# Patient Record
Sex: FEMALE | Race: BLACK OR AFRICAN AMERICAN | NOT HISPANIC OR LATINO | URBAN - METROPOLITAN AREA
[De-identification: names, ages, dates, MRNs, and addresses within clinical notes are randomized per-mention and may not be internally consistent; named-entity substitution may affect disease eponyms.]

---

## 2017-03-06 ENCOUNTER — OUTPATIENT (OUTPATIENT)
Dept: OUTPATIENT SERVICES | Facility: HOSPITAL | Age: 27
LOS: 1 days | End: 2017-03-06

## 2017-03-06 VITALS
TEMPERATURE: 99 F | HEART RATE: 88 BPM | WEIGHT: 156.97 LBS | SYSTOLIC BLOOD PRESSURE: 122 MMHG | RESPIRATION RATE: 16 BRPM | DIASTOLIC BLOOD PRESSURE: 72 MMHG | HEIGHT: 64 IN

## 2017-03-06 DIAGNOSIS — M26.601 RIGHT TEMPOROMANDIBULAR JOINT DISORDER, UNSPECIFIED: ICD-10-CM

## 2017-03-06 DIAGNOSIS — M26.629 ARTHRALGIA OF TEMPOROMANDIBULAR JOINT, UNSPECIFIED SIDE: ICD-10-CM

## 2017-03-06 LAB
HCG SERPL-ACNC: < 5 MIU/ML — SIGNIFICANT CHANGE UP
HCT VFR BLD CALC: 39.3 % — SIGNIFICANT CHANGE UP (ref 34.5–45)
HGB BLD-MCNC: 13.2 G/DL — SIGNIFICANT CHANGE UP (ref 11.5–15.5)
MCHC RBC-ENTMCNC: 32.8 PG — SIGNIFICANT CHANGE UP (ref 27–34)
MCHC RBC-ENTMCNC: 33.6 % — SIGNIFICANT CHANGE UP (ref 32–36)
MCV RBC AUTO: 97.5 FL — SIGNIFICANT CHANGE UP (ref 80–100)
PLATELET # BLD AUTO: 212 K/UL — SIGNIFICANT CHANGE UP (ref 150–400)
PMV BLD: 10.5 FL — SIGNIFICANT CHANGE UP (ref 7–13)
RBC # BLD: 4.03 M/UL — SIGNIFICANT CHANGE UP (ref 3.8–5.2)
RBC # FLD: 12.5 % — SIGNIFICANT CHANGE UP (ref 10.3–14.5)
WBC # BLD: 8.34 K/UL — SIGNIFICANT CHANGE UP (ref 3.8–10.5)
WBC # FLD AUTO: 8.34 K/UL — SIGNIFICANT CHANGE UP (ref 3.8–10.5)

## 2017-03-06 NOTE — H&P PST ADULT - NEGATIVE OPHTHALMOLOGIC SYMPTOMS
no photophobia/no blurred vision L/no blurred vision R/no lacrimation L/no discharge L/no lacrimation R/no diplopia

## 2017-03-06 NOTE — H&P PST ADULT - VISION (WITH CORRECTIVE LENSES IF THE PATIENT USUALLY WEARS THEM):
reading and distance glasses/Partially impaired: cannot see medication labels or newsprint, but can see obstacles in path, and the surrounding layout; can count fingers at arm's length

## 2017-03-06 NOTE — H&P PST ADULT - NSANTHOSAYNRD_GEN_A_CORE
No. SHOSHANA screening performed.  STOP BANG Legend: 0-2 = LOW Risk; 3-4 = INTERMEDIATE Risk; 5-8 = HIGH Risk

## 2017-03-06 NOTE — H&P PST ADULT - NEGATIVE ENMT SYMPTOMS
no sinus symptoms/no tinnitus/no ear pain/no nasal congestion/no hearing difficulty/no vertigo no hearing difficulty/no vertigo/no nasal congestion/no sinus symptoms

## 2017-03-06 NOTE — H&P PST ADULT - REASON FOR ADMISSION
adhesions ankylosis of articular adhesions ankylosis, right temporomandibular joint articular disc disorder.

## 2017-03-06 NOTE — H&P PST ADULT - HISTORY OF PRESENT ILLNESS
26yr old female with h/o pain and swelling around the right ear and vertigo in right ear x two yrs, presents to PST for pre surgical evaluation 26yr old female with h/o pain and swelling around the right ear and vertigo in right ear x two yrs, presents to [facility] for pre surgical evaluation for right temporomandibular joint arthroscopy on 3/9/17. 26yr old female with h/o intermittent pain and swelling around the right ear and vertigo only  in right ear x two yrs, presents to [facility] for pre surgical evaluation for right temporomandibular joint arthroscopy on 3/9/17.

## 2017-03-06 NOTE — H&P PST ADULT - PROBLEM SELECTOR PLAN 1
Scheduled to have right temporomandibular joint arthroscopy on 3/9/17. Pre surgical instructions provided.

## 2017-03-15 DIAGNOSIS — M26.639 ARTICULAR DISC DISORDER OF TEMPOROMANDIBULAR JOINT, UNSPECIFIED SIDE: ICD-10-CM

## 2017-04-24 ENCOUNTER — OUTPATIENT (OUTPATIENT)
Dept: OUTPATIENT SERVICES | Facility: HOSPITAL | Age: 27
LOS: 1 days | End: 2017-04-24

## 2017-04-24 VITALS
HEIGHT: 63.5 IN | TEMPERATURE: 98 F | SYSTOLIC BLOOD PRESSURE: 120 MMHG | DIASTOLIC BLOOD PRESSURE: 70 MMHG | HEART RATE: 77 BPM | RESPIRATION RATE: 14 BRPM | WEIGHT: 169.98 LBS | OXYGEN SATURATION: 100 %

## 2017-04-24 DIAGNOSIS — M26.631 ARTICULAR DISC DISORDER OF RIGHT TEMPOROMANDIBULAR JOINT: ICD-10-CM

## 2017-04-24 DIAGNOSIS — M26.629 ARTHRALGIA OF TEMPOROMANDIBULAR JOINT, UNSPECIFIED SIDE: ICD-10-CM

## 2017-04-24 DIAGNOSIS — M26.639 ARTICULAR DISC DISORDER OF TEMPOROMANDIBULAR JOINT, UNSPECIFIED SIDE: ICD-10-CM

## 2017-04-24 LAB
BUN SERPL-MCNC: 14 MG/DL — SIGNIFICANT CHANGE UP (ref 7–23)
CALCIUM SERPL-MCNC: 9 MG/DL — SIGNIFICANT CHANGE UP (ref 8.4–10.5)
CHLORIDE SERPL-SCNC: 106 MMOL/L — SIGNIFICANT CHANGE UP (ref 98–107)
CO2 SERPL-SCNC: 24 MMOL/L — SIGNIFICANT CHANGE UP (ref 22–31)
CREAT SERPL-MCNC: 0.95 MG/DL — SIGNIFICANT CHANGE UP (ref 0.5–1.3)
GLUCOSE SERPL-MCNC: 75 MG/DL — SIGNIFICANT CHANGE UP (ref 70–99)
HCT VFR BLD CALC: 38.6 % — SIGNIFICANT CHANGE UP (ref 34.5–45)
HGB BLD-MCNC: 12.7 G/DL — SIGNIFICANT CHANGE UP (ref 11.5–15.5)
MCHC RBC-ENTMCNC: 32.2 PG — SIGNIFICANT CHANGE UP (ref 27–34)
MCHC RBC-ENTMCNC: 32.9 % — SIGNIFICANT CHANGE UP (ref 32–36)
MCV RBC AUTO: 98 FL — SIGNIFICANT CHANGE UP (ref 80–100)
PLATELET # BLD AUTO: 241 K/UL — SIGNIFICANT CHANGE UP (ref 150–400)
PMV BLD: 10.2 FL — SIGNIFICANT CHANGE UP (ref 7–13)
POTASSIUM SERPL-MCNC: 4 MMOL/L — SIGNIFICANT CHANGE UP (ref 3.5–5.3)
POTASSIUM SERPL-SCNC: 4 MMOL/L — SIGNIFICANT CHANGE UP (ref 3.5–5.3)
RBC # BLD: 3.94 M/UL — SIGNIFICANT CHANGE UP (ref 3.8–5.2)
RBC # FLD: 12.9 % — SIGNIFICANT CHANGE UP (ref 10.3–14.5)
SODIUM SERPL-SCNC: 143 MMOL/L — SIGNIFICANT CHANGE UP (ref 135–145)
WBC # BLD: 7.49 K/UL — SIGNIFICANT CHANGE UP (ref 3.8–10.5)
WBC # FLD AUTO: 7.49 K/UL — SIGNIFICANT CHANGE UP (ref 3.8–10.5)

## 2017-04-24 RX ORDER — SODIUM CHLORIDE 9 MG/ML
1000 INJECTION, SOLUTION INTRAVENOUS
Qty: 0 | Refills: 0 | Status: DISCONTINUED | OUTPATIENT
Start: 2017-04-26 | End: 2017-05-11

## 2017-04-24 NOTE — H&P PST ADULT - NEGATIVE ENMT SYMPTOMS
no post-nasal discharge/no dry mouth/no vertigo/no dysphagia/no sinus symptoms/no recurrent cold sores/no ear pain/no nose bleeds/no abnormal taste sensation/no nasal discharge/no nasal congestion/no nasal obstruction/no throat pain/no tinnitus/no hearing difficulty

## 2017-04-24 NOTE — H&P PST ADULT - PROBLEM SELECTOR PLAN 1
Pt scheduled for right temporomandibular joint arthroscopy on 4/26/2017.  labs done results pending, Urine cup provided.  Preop teaching done, pt able to verbalize understanding.

## 2017-04-24 NOTE — H&P PST ADULT - PMH
PCOS (polycystic ovarian syndrome)    TMJ arthralgia  x 2yrs PCOS (polycystic ovarian syndrome)    TMJ arthralgia

## 2017-04-24 NOTE — H&P PST ADULT - NEGATIVE FEMALE-SPECIFIC SYMPTOMS
no irregular menses/no spotting/no abnormal vaginal bleeding/no pelvic pain no spotting/no pelvic pain/no abnormal vaginal bleeding

## 2017-04-24 NOTE — H&P PST ADULT - HISTORY OF PRESENT ILLNESS
25y/o female scheduled for right TMJ arthroscopy on 4/26/2017.  Pt states, "has difficulty opening and closing mouth due to right tmj pain, and clicking.  MRI was done. Surgery was previously scheduled for 3/2017 canceled due to illness."

## 2017-04-24 NOTE — H&P PST ADULT - NEUROLOGICAL
negative detailed exam Alert & oriented; no sensory, motor or coordination deficits, normal reflexes

## 2017-04-24 NOTE — H&P PST ADULT - NEGATIVE BREAST SYMPTOMS
no breast tenderness L/no breast lump L/no breast tenderness R/no nipple discharge L/no breast lump R/no nipple discharge R

## 2017-04-24 NOTE — H&P PST ADULT - VISION (WITH CORRECTIVE LENSES IF THE PATIENT USUALLY WEARS THEM):
Normal vision: sees adequately in most situations; can see medication labels, newsprint/reading and distance glasses

## 2017-04-24 NOTE — H&P PST ADULT - GASTROINTESTINAL DETAILS
soft/bowel sounds normal no distention/soft/no guarding/no organomegaly/no masses palpable/no rigidity/no bruit/nontender/no rebound tenderness/bowel sounds normal

## 2017-04-24 NOTE — H&P PST ADULT - NEGATIVE GENERAL SYMPTOMS
no weight loss/no sweating/no fever/no anorexia/no malaise/no polydipsia/no polyphagia/no polyuria/no chills/no fatigue/no weight gain

## 2017-04-26 ENCOUNTER — TRANSCRIPTION ENCOUNTER (OUTPATIENT)
Age: 27
End: 2017-04-26

## 2017-04-26 ENCOUNTER — OUTPATIENT (OUTPATIENT)
Dept: OUTPATIENT SERVICES | Facility: HOSPITAL | Age: 27
LOS: 1 days | Discharge: HOME | End: 2017-04-26

## 2017-04-26 VITALS
SYSTOLIC BLOOD PRESSURE: 116 MMHG | OXYGEN SATURATION: 99 % | RESPIRATION RATE: 14 BRPM | DIASTOLIC BLOOD PRESSURE: 70 MMHG | HEART RATE: 69 BPM

## 2017-04-26 VITALS
SYSTOLIC BLOOD PRESSURE: 120 MMHG | HEART RATE: 77 BPM | TEMPERATURE: 98 F | DIASTOLIC BLOOD PRESSURE: 70 MMHG | RESPIRATION RATE: 14 BRPM | OXYGEN SATURATION: 100 %

## 2017-04-26 DIAGNOSIS — M26.639 ARTICULAR DISC DISORDER OF TEMPOROMANDIBULAR JOINT, UNSPECIFIED SIDE: ICD-10-CM

## 2017-04-26 LAB — HCG UR QL: NEGATIVE — SIGNIFICANT CHANGE UP

## 2017-04-26 RX ORDER — IBUPROFEN 200 MG
1 TABLET ORAL
Qty: 20 | Refills: 0 | OUTPATIENT
Start: 2017-04-26 | End: 2017-05-01

## 2017-04-26 RX ORDER — CEPHALEXIN 500 MG
1 CAPSULE ORAL
Qty: 28 | Refills: 0 | OUTPATIENT
Start: 2017-04-26 | End: 2017-05-03

## 2017-04-26 RX ORDER — FENTANYL CITRATE 50 UG/ML
25 INJECTION INTRAVENOUS
Qty: 0 | Refills: 0 | Status: DISCONTINUED | OUTPATIENT
Start: 2017-04-26 | End: 2017-04-26

## 2017-04-26 RX ORDER — FENTANYL CITRATE 50 UG/ML
50 INJECTION INTRAVENOUS
Qty: 0 | Refills: 0 | Status: DISCONTINUED | OUTPATIENT
Start: 2017-04-26 | End: 2017-04-26

## 2017-04-26 RX ORDER — ONDANSETRON 8 MG/1
4 TABLET, FILM COATED ORAL ONCE
Qty: 0 | Refills: 0 | Status: DISCONTINUED | OUTPATIENT
Start: 2017-04-26 | End: 2017-05-11

## 2017-04-26 RX ADMIN — FENTANYL CITRATE 25 MICROGRAM(S): 50 INJECTION INTRAVENOUS at 09:45

## 2017-04-26 RX ADMIN — SODIUM CHLORIDE 30 MILLILITER(S): 9 INJECTION, SOLUTION INTRAVENOUS at 09:16

## 2017-04-26 RX ADMIN — FENTANYL CITRATE 25 MICROGRAM(S): 50 INJECTION INTRAVENOUS at 10:15

## 2017-04-26 NOTE — H&P ADULT. - GASTROINTESTINAL DETAILS
no masses palpable/no organomegaly/no distention/no bruit/no rebound tenderness/nontender/soft/no guarding/bowel sounds normal/no rigidity

## 2017-04-26 NOTE — H&P ADULT. - NEGATIVE CARDIOVASCULAR SYMPTOMS
no orthopnea/no paroxysmal nocturnal dyspnea/no palpitations/no chest pain/no peripheral edema/no dyspnea on exertion

## 2017-04-26 NOTE — H&P ADULT. - NEGATIVE BREAST SYMPTOMS
no breast tenderness L/no breast tenderness R/no breast lump R/no nipple discharge L/no breast lump L/no nipple discharge R

## 2017-04-26 NOTE — ASU DISCHARGE PLAN (ADULT/PEDIATRIC). - NOTIFY
Numbness, color, or temperature change to extremity Swelling that continues/Numbness, tingling/Pain not relieved by Medications/Numbness, color, or temperature change to extremity/Unable to Urinate/Bleeding that does not stop/Fever greater than 101/Inability to Tolerate Liquids or Foods/Persistent Nausea and Vomiting

## 2017-04-26 NOTE — H&P ADULT. - NEGATIVE GENERAL SYMPTOMS
no chills/no polyuria/no malaise/no fever/no polyphagia/no anorexia/no polydipsia/no sweating/no weight gain/no fatigue/no weight loss

## 2017-04-26 NOTE — H&P ADULT. - NEGATIVE ENMT SYMPTOMS
no hearing difficulty/no nasal obstruction/no nasal discharge/no abnormal taste sensation/no recurrent cold sores/no tinnitus/no vertigo/no dry mouth/no sinus symptoms/no nasal congestion/no ear pain/no nose bleeds/no post-nasal discharge/no throat pain/no dysphagia

## 2017-04-26 NOTE — ASU DISCHARGE PLAN (ADULT/PEDIATRIC). - MEDICATION SUMMARY - MEDICATIONS TO TAKE
I will START or STAY ON the medications listed below when I get home from the hospital:    Vicodin 300 mg-5 mg oral tablet  -- 1 tab(s) by mouth every 6 hours, As Needed -for severe pain MDD:8 tabs  -- Caution federal law prohibits the transfer of this drug to any person other  than the person for whom it was prescribed.  Do not drink alcoholic beverages when taking this medication.  May cause drowsiness.  Alcohol may intensify this effect.  Use care when operating dangerous machinery.  This drug may impair the ability to drive or operate machinery.  Use care until you become familiar with its effects.  This product contains acetaminophen.  Do not use  with any other product containing acetaminophen to prevent possible liver damage.  Using more of this medication than prescribed may cause serious breathing problems.    -- Indication: For pain    ibuprofen 600 mg oral tablet  -- 1 tab(s) by mouth every 6 hours  -- Do not take this drug if you are pregnant.  It is very important that you take or use this exactly as directed.  Do not skip doses or discontinue unless directed by your doctor.  May cause drowsiness or dizziness.  Obtain medical advice before taking any non-prescription drugs as some may affect the action of this medication.  Take with food or milk.    -- Indication: For pain    Keflex 500 mg oral capsule  -- 1 cap(s) by mouth 4 times a day  -- Finish all this medication unless otherwise directed by prescriber.    -- Indication: For Antibiotic

## 2017-04-28 NOTE — PACU DISCHARGE NOTE - PAIN:
See 4/7/17 encounter. Patients son Willi Blue requested that this refill be sent to the [facility] in [place] and that was done on 4/7/17.  I left message for patients son to call back and let us know if they need at [facility] in [facility] now, I tried patient but she w Controlled with current regime

## 2017-05-07 ENCOUNTER — EMERGENCY (EMERGENCY)
Facility: HOSPITAL | Age: 27
LOS: 1 days | Discharge: HOME | End: 2017-05-07
Attending: EMERGENCY MEDICINE | Admitting: EMERGENCY MEDICINE
Payer: MEDICAID

## 2017-05-07 VITALS
SYSTOLIC BLOOD PRESSURE: 118 MMHG | TEMPERATURE: 98 F | RESPIRATION RATE: 16 BRPM | DIASTOLIC BLOOD PRESSURE: 74 MMHG | HEART RATE: 63 BPM
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
## 2017-05-07 DIAGNOSIS — M25.551 PAIN IN RIGHT HIP: ICD-10-CM
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
## 2017-05-07 DIAGNOSIS — M25.461 EFFUSION, RIGHT KNEE: ICD-10-CM

## 2017-05-07 DIAGNOSIS — Z87.891 PERSONAL HISTORY OF NICOTINE DEPENDENCE: ICD-10-CM
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
## 2017-05-07 DIAGNOSIS — S76.219A STRAIN OF ADDUCTOR MUSCLE, FASCIA AND TENDON OF UNSPECIFIED THIGH, INITIAL ENCOUNTER: ICD-10-CM

## 2017-05-07 PROCEDURE — 73502 X-RAY EXAM HIP UNI 2-3 VIEWS: CPT

## 2017-05-07 PROCEDURE — 73502 X-RAY EXAM HIP UNI 2-3 VIEWS: CPT | Mod: 26,RT

## 2017-05-07 PROCEDURE — 73562 X-RAY EXAM OF KNEE 3: CPT | Mod: 26,RT

## 2017-05-07 PROCEDURE — 99284 EMERGENCY DEPT VISIT MOD MDM: CPT | Mod: 25

## 2017-05-07 PROCEDURE — 73562 X-RAY EXAM OF KNEE 3: CPT

## 2017-05-07 PROCEDURE — 72170 X-RAY EXAM OF PELVIS: CPT

## 2017-05-07 PROCEDURE — 99284 EMERGENCY DEPT VISIT MOD MDM: CPT

## 2017-05-07 RX ORDER — IBUPROFEN 200 MG
600 TABLET ORAL ONCE
Qty: 0 | Refills: 0 | Status: COMPLETED | OUTPATIENT
Start: 2017-05-07 | End: 2017-05-07

## 2017-05-07 RX ADMIN — Medication 600 MILLIGRAM(S): at 19:01

## 2017-05-07 NOTE — ED PROCEDURE NOTE - PROCEDURE ADDITIONAL DETAILS
Compressive Ace wrap applied to R hip and thigh for likely adductor hip strain. After application patient ambulated easily with crutches and distal extremity was neurovascularly intact.

## 2017-05-07 NOTE — ED PROVIDER NOTE - MEDICAL DECISION MAKING DETAILS
Patient likely with adductor muscle strain of R groin, possible knee ligamentous injury. Plan: pain control, xrays, f/u ortho Patient likely with adductor muscle strain of R groin, possible knee ligamentous injury. Plan: pain control, xrays, f/u ortho       Attending note-right hip adductor strain,  incidental finding of right knee effusion. Impression with Ace bandage, crutches, followup with orthopedics for physical therapy. NSAIDs and ice.

## 2017-05-07 NOTE — ED ADULT NURSE NOTE - OBJECTIVE STATEMENT
pt is a 26 yr F present with R hip/groin pain that radiates down to knee after slipping in shower yesterday. pt denies hitting head or LOC. +difficulty bearing weight. denies numbness/tingling/loss of urine or bowels. no distress.

## 2017-05-07 NOTE — ED PROVIDER NOTE - PHYSICAL EXAMINATION
Gen: NAD, AOx3  Head: NCAT  HEENT: PERRL, oral mucosa moist, normal conjunctiva  Lung: CTAB, no respiratory distress  CV: rrr, no murmurs, Normal perfusion  Abd: soft, NTND, no CVA tenderness  MSK: No edema, no visible deformities, RLE - R ankle nontender with FROM, R knee with inferomedial tenderness with decreased ROM, R hip diffusely tender no bruising or swelling, no shortening or external rotation of RLE; L straight leg raise negative, R straight leg raise produces sharp pain in groin; pelvis stable nontender; distal pulses strong and symmetrical bilaterally  Neuro: No focal neurologic deficits, CN intact, motor and sensation intact, no cerebellar signs   Skin: No rash   Psych: normal affect Gen: NAD, AOx3  Head: NCAT  HEENT: PERRL, oral mucosa moist, normal conjunctiva  Lung: CTAB, no respiratory distress  CV: rrr, no murmurs, Normal perfusion  Abd: soft, NTND, no CVA tenderness  MSK: No edema, no visible deformities, RLE - R ankle nontender with FROM, R knee with inferomedial tenderness with decreased ROM, R hip diffusely tender no bruising or swelling, no shortening or external rotation of RLE; L straight leg raise negative, R straight leg raise produces sharp pain in groin; pelvis stable nontender; distal pulses strong and symmetrical bilaterally  Neuro: No focal neurologic deficits, CN intact, motor and sensation intact, no cerebellar signs   Skin: No rash   Psych: normal affect       Attending note. Patient is alert and in moderate distress due to right groin pain. Patient has tenderness over the abductors of the right hip. There is no anterior tenderness the hip. Skin is normal. Pain is exacerbated by adduction against resistance, and hip abduction. Pain is also exacerbated by internal and external rotation of the hip with pain referred to the medial thigh. Knee is nontender to palpation. Sensation is normal.

## 2017-05-07 NOTE — ED PROVIDER NOTE - OBJECTIVE STATEMENT
Patient is 26 y F with PMH PCOS, PreDM2 noncompliant with metformin, presenting with R leg pain after slipping in the shower yesterday, twisted leg oddly while getting out of shower and felt "crack" sensation in hip. Has pain in R groin and thigh with electricity sensation down R leg, no back pain. Did not fall or hit head. Took motrin and tylenol today with no help. No saddle anesthesia or urinary/bowel incontinence. Ambulating with severe pain. LMP: 4/25     ROS: Denies fever, palpitations, chills, recent sickness, HA, vision changes, cough, SOB, chest pain, abdominal pain, n/v/d/c, dysuria, hematuria, rash, new joint aches, sick contacts, and recent travel. Patient is 26 y F with PMH PCOS, preDM2 noncompliant with metformin, presenting with R leg pain since yesterday, twisted leg oddly while getting out of shower and felt "crack" sensation in hip. Has sharp pain in R groin, no back pain. Did not fall or hit head, no LOC. Took motrin and tylenol today with no help. No saddle anesthesia or urinary/bowel incontinence. Ambulating with severe pain. LMP: 4/25     ROS: Denies fever, palpitations, chills, recent sickness, HA, vision changes, cough, SOB, chest pain, abdominal pain, n/v/d/c, dysuria, hematuria, rash, new joint aches, sick contacts, and recent travel. Patient is 26 y F with PMH PCOS, preDM2 noncompliant with metformin, presenting with R leg pain since yesterday, twisted leg oddly while getting out of shower and felt "crack" sensation in hip. Has sharp pain in R groin, no back pain. Did not fall or hit head, no LOC. Took motrin and tylenol today with no help. No saddle anesthesia or urinary/bowel incontinence. Ambulating with severe pain. LMP: 4/25     ROS: Denies fever, palpitations, chills, recent sickness, HA, vision changes, cough, SOB, chest pain, abdominal pain, n/v/d/c, dysuria, hematuria, rash, sick contacts, and recent travel. Patient is 26 y F with PMH PCOS, preDM2 noncompliant with metformin, presenting with R leg pain since yesterday, twisted leg oddly while getting out of shower and felt "crack" sensation in hip. Has sharp pain in R groin, no back pain. Did not fall or hit head, no LOC. Took motrin and tylenol today with no help. No saddle anesthesia or urinary/bowel incontinence. Ambulating with severe pain. LMP: 4/25     ROS: Denies fever, palpitations, chills, recent sickness, HA, vision changes, cough, SOB, chest pain, abdominal pain, n/v/d/c, dysuria, hematuria, rash, sick contacts, and recent travel.       Attending note. Patient was seen in the fast track from #3. she slipped getting out of the bathtub 2 days ago. Patient caught herself and felt sudden pain in the right groin. Pain radiates to the medial aspect of the thigh and knee and is exacerbated by movement of her right hip. Patient denies any numbness or paresthesia. Patient has a history of chronic intermittent right knee pain.

## 2017-05-07 NOTE — ED PROVIDER NOTE - CARE PLAN
Principal Discharge DX:	Groin strain Principal Discharge DX:	Groin strain  Secondary Diagnosis:	Knee effusion, right

## 2018-03-29 NOTE — H&P PST ADULT - NEGATIVE RESPIRATORY AND THORAX SYMPTOMS
Plan:       Labs today: medication levels, CBC, metabolic panel. We willnotify you of the results.   Continue current doses of the Keppra, Lamotrigine and Carbamazepine for now.  Let me know if you decide to lower the Keppra dose - I will advise you on the dosing again at that time.   Continue the Fioricet as needed for headaches.   Return to clinic in 6 months.   
no wheezing/no cough/no dyspnea/no hemoptysis/no pleuritic chest pain

## 2018-07-16 PROBLEM — M26.629 ARTHRALGIA OF TEMPOROMANDIBULAR JOINT, UNSPECIFIED SIDE: Chronic | Status: ACTIVE | Noted: 2017-03-06

## 2018-12-10 NOTE — H&P PST ADULT - TEMPERATURE IN CELSIUS (DEGREES C)
Physician Pre-Sedation Assessment    Pre-Sedation Assessment:    Sedation History: Previous Sedation with No Complications and Airway Assessed    Cardiac: normal S1, S2  Respiratory: breath sounds clear bilaterally   Abdomen: soft, BS (+), non-tender    AS
36.5

## 2019-08-31 NOTE — H&P PST ADULT - WEIGHT IN LBS
169.9

## 2020-09-24 NOTE — ASU PREOP CHECKLIST - ISOLATION PRECAUTIONS
Heide Woodall, RICCARDO CNP   9/24/2020  9:46 AM       Labs stable, glucose is high - please mention to patient .   Plan: increase Torsemide to 60 mg BID - BMP next week 9/30 and visit 10/1.   Potassium looks like she takes 2 tabs daily (20 mEq) - please take 3 tabs (30 mEq ) daily with the torsemide increase      Called and spoke to patient . Reviewed lab results. She states she had eaten right before her lab draw.   Reviewed above recommendations with patient. She read back instructions. Lab and appt with Heide already made for next week.     CONCETTA Thomas    
none

## 2021-10-29 NOTE — H&P ADULT. - NS PRO PASSIVE SMOKE EXP
Substance Abuse and Addiction Resources    [facility]  [phone]  Closed meeting- family members that have been affected bysomeone alcohol abuse  Open meeting everyone can attend. Alcoholic's Anonymous [phone]  Non professional (alcoholics in recovery helping others)  Hold Meetings (talk about sobriety, have desire)  No charge    Mila Samaniego is the Treatment Consultant in the [facility]  Free one-on-one phone consultation and insurance verification  12 step based meetings and philosophy  Family programs and sessions    [facility] [phone]  Free individual assessment  Intensive outpatient outpatient program  Ambulatory withdrawal management/detoxification  Insurance required    [employer]  [phone] [facility] location), [phone] ([place] location)  An Office Based Opioid Treatment Program  Individual and Group therapy, Peer Recovery Services and Care Coordination  Accepting Medicare, Medicaid and Commercial/private insurance  $200 a month self-pay program (does not include medicine or outside labs)  [address] [phone] (Monday - Friday, 9a-5p. Standing [id number] [facility] 10:00-11:00 Monday)  [facility], [address] [place] [address] (Tuesday - Friday, 9a-5p Standing [id number] [facility] referral appointment 10:00-11:00 Tuesday - Friday)    [facility] [phone] ext [phone] or [phone]  Good for patients with no insurance, Medicaid, Medicare  Sliding fee scale available for self pay  Patients go Monday-Friday from 8-4:30 to central intake for a shelter and then to [facility] for services  Offers a variety of services I.e.  Laundry, shower, eye clinic, medical clinic, dental, substance abuse services, case management, etc.    [facility] [phone]  Make appointment with counselor  $27 fee for initial hour intake    [address] [phone]  Offers Detox and Inpatient Treatment for men only. Social detox  Is a homeless shelter with longterm 12 step program. Can choose just access to the hshelter component  Must be able to walk <1miles one way to attend classes, be highly motivated and willing to complete all 12 steps. 8 months- 1 year is the typical length of stay if accepted    [facility] [place] [phone]  For residents of [facility]  They offer outpatient treatment and can make referrals for inpatient careBased on income. Will [employer]. Accept Medicaid. They have a walk in clinic that patients can go to be screened for services. Two on the [facility] and Strong side [facility]:   [address]ach[address]ha Mendez, [address] (near [facility]). Walk in hours: Mon or Wed       12:30pm - Dory Lopez [facility] Aline Gillis. Walk in hours: Tuesday 12:30pm 3pm Wed       8:30am- [address] [place] [address] [phone] [facility] location)   [facility] [place] [address]  Alcohol detox and rehabilitation  Opioid and other drug rehabilitation, Medication assisted treatment  Accepts commercial/private insurance  Call or email [facility]: [phone], [email or web address] to schedule an appointment    [facility] [phone]  $3500 entry fee  12 step meeting plan  No sex offenders accepted. Must get a job within 30 days after admission  After the 12 week, additional $125/week to stay    Narcotic Anonymous [phone], [phone]  Will refer to [facility] into Triage (takes 10-15 minutes)  Proof of [facility] residence with Picture ID  Medicaid and Self Pay. NO Private insurance    [place] [phone]  Referrals come from organizations like [employer], [employer], [facility]. Must be self pay. No insurance allowed. No patients on prescribed narcotics. No sex offenders.   Need all medical mental health information and medication list sent prior to admit. [facility] [phone] ext [phone] between 21-65  Need social security card and picture ID  Must pass breath test and 10 panel Urine Drug Screen  No Sex Offenders or Psychiatric patients  Must not have been a 3x repeat at this facility  6 month in house- has to participate in work therapy 40 hours/week  Participates in spiritual classes, bible study and Yazidi    [place] Alcohol[facility] [address], sent by [email or web address]  No Sex [address] (by [address])    [facility]  [phone]  Monday through Friday 8:30am to 5:00pm  Brief Telephone Interview. Then will be scheduled for next substance abuse services orientation group and then scheduled for intake interview. [facility]  [phone]  Walk in Monday through Friday 9:00AM to 3:00PM  Bring Picture ID, Proof of residence (piece of mail), Proof of Insurance ([address] scale), Proof of income (any)    Azam B [phone]  Walk in then Assessment by licensed professional   Deandra Blair office ([address]) Monday, Tuesday, Thursday  9AM to 789 [facility] office ([address] [place], [address]) [facility] 81  [phone]  Walk In Monday to Friday starting at [address] [facility] 75, Proof of residence (piece of mail), Proof of insurance (Medicaid/sliding scale)  At 9AM is the Substance Abuse Services Orientation Group and then scheduled for Intake Evaluation. No

## 2022-05-25 NOTE — H&P PST ADULT - SPIRITUAL CULTURAL, CURRENT SITUATION, PROFILE
Patient called and said his wife saw GYN doctor and tested positive for Trichomoniasis. Was advised to call pcp for treatment. Can you send in medication or ov needed? Please advise. no

## 2022-07-04 NOTE — H&P PST ADULT - NEGATIVE GASTROINTESTINAL SYMPTOMS
04-Jul-2022 03:49 04-Jul-2022 03:00 no constipation/no nausea/no vomiting/no change in bowel habits/no diarrhea

## 2024-02-21 NOTE — ASU PATIENT PROFILE, ADULT - ANESTHESIA, PREVIOUS REACTION, PROFILE
----- Message from Hitesh Kenny sent at 2/21/2024 11:45 AM CST -----  Contact: self  [phone]  Pt requesting EKG order for heart palpitations.    Please call and advise      unknown

## 2025-01-16 NOTE — H&P ADULT. - BREASTS DETAILS
Physical Therapy Visit    Visit Type: Daily Treatment Note  Visit: 5  Referring Provider: Cathy Horn DO  Medical Diagnosis (from order): M54.41 - Acute right-sided low back pain with right-sided sciatica     SUBJECTIVE                                                                                                               Patient reports that she's feeling good. Had a good week. Reaching into the trunk caused a little bit of pain in the leg.     Pain / Symptoms  - Pain rating (out of 10): Current: 0       OBJECTIVE                                                                                                                                       Treatment     Therapeutic Exercise  Performed:  - bike 5min, level 5, seat 3   - hip adduction w ball 2x10  - seated sciatic nerve glide 2x10   - supine sciatic nerve glide 2x10   - hamstring stretch [id number]  - piriformis stretch [id number]   - straight leg raise 2# 2x10  - sidelying leg raise 2x10  - clamshell yellow 2x10   - bosu split squat 2x10   - 3 way hip x10   - calf raises 2x15  - calf stretch [id number]  - heel taps 2x15   - hip flexor stretch [id number]  - lunges 2x10   - squats 2x10  - rockerboard front/back/sides x1min   - bosu calf raises 2x15    Not performed:  - side steps yellow band 2 laps  - monster walks yellow band 2 laps   - supine sciatic nerve glide 2x10   - bridges 2x10  - squats 2x10  - step ups   - bird dog   - dead bug     Skilled input: verbal instruction/cues and tactile instruction/cues    Writer verbally educated and received verbal consent for hand placement, positioning of patient, and techniques to be performed today from patient for clothing adjustments for techniques, therapist position for techniques and hand placement and palpation for techniques as described above and how they are pertinent to the patient's plan of care.  Home Exercise Program  Access Code: [email or web address]  URL: [email or web address]  Date: 
01/16/2025  Prepared by: Eloina Venegas    Exercises  - Supine Sciatic Nerve Glide  - 2 x daily - 5-6 x weekly - 2 sets - 10 reps  - Seated Sciatic Tensioner  - 2 x daily - 5-6 x weekly - 2 sets - 10 reps  - Supine Hamstring Stretch with Strap  - 2 x daily - 5-6 x weekly - 2 sets - 30sec  hold  - Standing Bilateral Gastroc Stretch with Step  - 2 x daily - 5-6 x weekly - 2 sets - 30sec hold  - Side Stepping with Resistance at Ankles  - 2 x daily - 5-6 x weekly - 2 sets - 10 reps  - Forward Monster Walks  - 2 x daily - 5-6 x weekly - 2 sets - 10 reps  - Standing Heel Raise  - 2 x daily - 5-6 x weekly - 2 sets - 10 reps  - Hip Flexor Stretch with Chair  - 2 x daily - 5-6 x weekly - 2 sets - 10 reps  - Clamshell with Resistance  - 2 x daily - 5-6 x weekly - 2 sets - 10 reps  - Standard Lunge  - 2 x daily - 5-6 x weekly - 2 sets - 10 reps  - Squat  - 2 x daily - 5-6 x weekly - 2 sets - 10 reps  - Standing Single Leg Heel Raise  - 2 x daily - 5-6 x weekly - 2 sets - 10 reps      ASSESSMENT                                                                                                            Patient tolerated session well compared to last session. Tension in leg as reduced a lot. Patient would benefit on strengthening interventions. Progressed with strengthening interventions during session today. Home exercise program updated.   Pain/symptoms after session (out of 10): 1  Education:   - Present and ready to learn: patient  - Results of above outlined education: Demonstrates understanding and Verbalizes understanding    PLAN                                                                                                                           Suggestions for next session as indicated: Progress per plan of care       Therapy procedure time and total treatment time can be found documented on the Time Entry flowsheet    
well
no tenderness